# Patient Record
Sex: MALE | URBAN - METROPOLITAN AREA
[De-identification: names, ages, dates, MRNs, and addresses within clinical notes are randomized per-mention and may not be internally consistent; named-entity substitution may affect disease eponyms.]

---

## 2017-06-05 ENCOUNTER — HISTORICAL (OUTPATIENT)
Dept: ADMINISTRATIVE | Facility: HOSPITAL | Age: 57
End: 2017-06-05

## 2017-06-05 LAB
ABS NEUT (OLG): 14.4 X10(3)/MCL (ref 1.5–6.9)
ALBUMIN SERPL-MCNC: 2.7 GM/DL (ref 3.4–5)
ALBUMIN/GLOB SERPL: 0.84 MG/DL (ref 1.1–1.6)
ALP SERPL-CCNC: 82 UNIT/L (ref 45–117)
ALT SERPL-CCNC: 127 UNIT/L (ref 12–78)
AST SERPL-CCNC: 40 UNIT/L (ref 15–37)
BASOPHILS # BLD AUTO: 0 X10(3)/MCL (ref 0–0.2)
BASOPHILS NFR BLD AUTO: 0.1 % (ref 0–2)
BILIRUB SERPL-MCNC: 0.8 MG/DL (ref 0.2–1)
BILIRUBIN DIRECT+TOT PNL SERPL-MCNC: 0.2 MG/DL (ref 0–0.2)
BILIRUBIN DIRECT+TOT PNL SERPL-MCNC: 0.6 MG/DL (ref 0.2–0.8)
BUN SERPL-MCNC: 10 MG/DL (ref 7–18)
CALCIUM SERPL-MCNC: 7.7 MG/DL (ref 8.5–10.1)
CHLORIDE SERPL-SCNC: 101 MMOL/L (ref 98–107)
CO2 SERPL-SCNC: 29 MMOL/L (ref 21–32)
CREAT SERPL-MCNC: 0.79 MG/DL (ref 0.6–1.3)
EOSINOPHIL # BLD AUTO: 0.2 X10(3)/MCL (ref 0–0.7)
EOSINOPHIL NFR BLD AUTO: 1.1 % (ref 0–5)
ERYTHROCYTE [DISTWIDTH] IN BLOOD BY AUTOMATED COUNT: 14.6 % (ref 10.2–13.4)
GLOBULIN SER-MCNC: 3.2 GM/DL (ref 2–3.8)
GLUCOSE SERPL-MCNC: 106 MG/DL (ref 74–106)
HCT VFR BLD AUTO: 40.4 % (ref 39–50)
HGB BLD-MCNC: 14.2 GM/DL (ref 13.2–17.2)
IMM GRANULOCYTES # BLD AUTO: 0 10*3/UL (ref 0–0)
IMM GRANULOCYTES NFR BLD AUTO: 0.9 % (ref 0–2)
LYMPHOCYTES # BLD AUTO: 1.2 X10(3)/MCL (ref 0.5–4.1)
LYMPHOCYTES NFR BLD AUTO: 6.8 % (ref 21–51)
MCH RBC QN AUTO: 30.4 PG (ref 28–32)
MCHC RBC AUTO-ENTMCNC: 35.1 GM/DL (ref 33–36)
MCV RBC AUTO: 86.5 FL (ref 78–100)
MONOCYTES # BLD AUTO: 1.1 X10(3)/MCL (ref 0.1–1.1)
MONOCYTES NFR BLD AUTO: 6.5 % (ref 4–12)
NEUTROPHILS # BLD AUTO: 14.4 X10(3)/MCL (ref 1.5–6.9)
NEUTROPHILS NFR BLD AUTO: 84.6 % (ref 42–75)
PLATELET # BLD AUTO: 150 X10(3)/MCL (ref 130–400)
PMV BLD AUTO: 11.7 FL (ref 7.4–10.4)
POTASSIUM SERPL-SCNC: 3.69 MMOL/L (ref 3.5–5.1)
PROT SERPL-MCNC: 5.9 GM/DL (ref 6.4–8.2)
RBC # BLD AUTO: 4.67 X10(6)/MCL (ref 4.1–5.9)
SODIUM SERPL-SCNC: 134 MMOL/L (ref 136–145)
WBC # SPEC AUTO: 17 X10(3)/MCL (ref 4–10)

## 2022-04-30 NOTE — PROGRESS NOTES
"   Patient:   Shyam Whyte             MRN: [id number]            FIN: [id number]               Age:   57 years     Sex:  Male     :  1960   Associated Diagnoses:   Chronic respiratory conditions due to chemicals, gases, fumes and vapors; Continuous tobacco abuse; Moderate anxiety; Other specified types of non-Hodgkin lymphoma, intrapelvic lymph nodes   Author:   Gaudencio MEJIA, Lucia CALHOUN      Visit Information       Primary: Bernardino Benítez NP  Surgeon: Dr. Guevara Hightower  Cardiology: Dr. Tapia  Pulmonology: Dr. Ortiz      Visit type:  [facility] Visit.      No show 5/10/2017  NO Show 2017      Interval History       56 year old male has a new diagnosis of marginal zone lymphoma of the rectum.  Dr. Diallo did a colonoscopy   and  he was sent for a another colonoscopy with Dr. Castillo.  He had an area of suspicion in his  rectum.  A biopsy was taken 2016.  The pathology was reported 2016.  However, he was not told about this area  until 2017.    He has night sweats to the point of wet sheets-- started 1 year ago.  Sweats are almost every night.  NO fever.  NO wt loss.    He has had lung congestion for years and is on the "verge of COPD".  He has PFTs that indicate emphysema / COPD.  He continues to smoke.    Shyam is now  using his breathing treatments  and his lungs sound somewhat better.      His biopsy is scheduled for 3/17/2017 with Dr. Hightower could not be done because he was bleeding and on anticoagulants.   Therefore, given our limitations and his increasing rectal symptoms, I  started therapy with R-CVP .    He has called a few times with constipation.  vincristine canc cause constipation but his constipation lasted long after chemo out of his system.  His disease is a rectal lesion but I cannot help but think it is starting to respond to chemo--as lymphoma does.  It may be the nerves in his rectum are not functioning well.  He has pain, leakage and since starting  " "chemo-- he had one large clot and 4/15/2017 he had a much smaller clot.    Wife says he has confusion but this is usually med related.  He is on xanax and pain meds.  He is not to drive.    Stool caliber is getting "bigger".  Prior to chemo, his stools were thin.    5/23/2017- 2 weeks ago he missed his appt and chemo due to fever up to 103F. He did not get seendespite having 2 appts. rescheduled with Dr. Chase. He had N/V/D for 2 days with fever. Has had pain in left lower quadrant since. BM's have been normal. 6# wt. loss. Has not had a good appitite. Has been drinking Ensure and tries to make himself eat. Abdominal pain  7/10 with palpation, 5/10 otherwise for past 2 weeks.       GOAL OF THERAPY: 6  planned--radiation an option  Started R-CVP 3/28/17    Chemo: R-CVP--C1 -- 3/28/2017               R-CVP--C2--4/19/2017      Allergy: NKDA        PAST MEDICAL HISTORY:  1. appendectomy, tonsillectomy, neck surgery--fusion, rectal surgery 2001 prolapsed  Dr. Castillo  2. Colonoscopy 2014-- Dr. Diallo "everything was fine           but one polyp.  3. CT chest 2/9/2017- emphysema both apices                  few enlarged mediastinal lymph nodes             Right posterior 11th rib (unchanged since 3/2/2016)  4. CT abd/pelvis 2/9/2017-rectal mass 5.8 x 4.8 x 7.8 cm          left iliac adenopathy 3  noted.  5. MRI l spine--2/7/2017-- multi level arthritis. L4-5 disc protrusion  6. He had stents placed by Dr. Ocampo's wife-- in legs  7. PFT 3/14/2017-- FVC 51% predictive                     FEV1 48% , DLCO89%                   severe airway obstruction  8. Hep B negative.  9.  CAD-- 2 stent in LAD, 1 stents Right coronary--  10. COPD/emphysema  11.  Central Sleep apnea  12. Rectal marginal lymphoma--          with left iliac adenopathy         stage II disease-- rectal and             one other node group           Bone marrow biopsy  2/15/2017-- hypercellular for age                No lymphoma, normal flow              " "  normal cytogenetics          SOCIAL HISTORY:  .  5 children.  5 grandchildren--   Worked in [employer] field until he had an accident       [place].   but went on lower back           pain since -- disability since    He likes to fish but he his back and shoulder arthritis does not let       him.  "rotary cuff" is grinding."  Drinks 3 beers a day -- for years  [phone]  Jenn [phone]    FAMILY HISTORY:  Mother--unhealthy  Father  of brain aneurysm at 36  PGF  of throat cancerMaternal relatives with lung and colon cancer    CODE STATUS:  not addressed      Review of Systems   Constitutional:  Negative except as documented in history of present illness.    Eye:  Negative except as documented in history of present illness.    Ear/Nose/Mouth/Throat:  Negative except as documented in history of present illness.    Respiratory:  Negative except as documented in history of present illness.    Cardiovascular:  Negative except as documented in history of present illness.    Breast   Gastrointestinal:  Negative except as documented in history of present illness.    Genitourinary:  Negative except as documented in history of present illness.    Hematology/Lymphatics:  Negative except as documented in history of present illness.    Endocrine:  Negative except as documented in history of present illness.    Immunologic:  Negative except as documented in history of present illness.    Musculoskeletal:  Negative except as documented in history of present illness.    Integumentary:  Negative except as documented in history of present illness.    Neurologic:  Negative except as documented in history of present illness.    Psychiatric:  Negative except as documented in history of present illness.    All other systems.     Health Status   Allergies:    Allergic Reactions (Selected)  No Known Allergies,    Allergies (1) Active Reaction  No Known Allergies None Documented     Current medications: "  (Selected)   Outpatient Medications  Ordered  Vincasar PFS: 1.5 mg, form: Soln, IV Push, Once-Unscheduled, first dose 05/10/17 5:52:00 CDT, [id number]  cyclophosphamide: 1 gm, form: Powder-Inj, IV, Once-Unscheduled, Infuse over: 2 hr, first dose 05/10/17 5:49:00 CDT, [id number]  riTUXimab: 635 mg, form: Injection, IV, Once-Unscheduled, Infuse over: 2 hr, first dose 05/10/17 5:47:00 CDT, [id number]  Documented Medications  Documented  ALPRAZOLAM   TAB 0.5M.5 mg = 1 tab(s), Oral, TID  ALPRAZOLAM   TAB 1M mg = 1 tab(s), Oral, TID  AMPHET/DEXTR CAP 15MG ER: 15 mg = 1 cap(s), Oral, qAM  ATORVASTATIN TAB 20M mg = 1 tab(s), Oral, Daily  BUSPIRONE    TAB 10MG:   CARVEDILOL   TAB 25M mg = 1 tab(s), Oral, BID  CITALOPRAM   TAB 40M mg = 1 tab(s), Oral, Daily  CLOPIDOGREL  TAB 75MG:   DEXAMETHASON TAB 4MG:   DEXILANT     CAP 60MG DR: 60 mg = 1 cap(s), Oral, Daily  FLUCONAZOLE  TAB 200M mg = 1 tab(s), Oral, Daily  GABAPENTIN   CAP 100MG:   HYDROCO/APAP TAB 10-325M tab(s), Oral, BID  HYDROCO/APAP TAB 5-325MG:   HYDROXYZ MED CAP 50MG:   LISINOPRIL   TAB 5M mg = 1 tab(s), Oral, Daily  METOCLOPRAM  TAB 10MG:   METOCLOPRAM  TAB 5MG:   NYSTATIN     PHONG 120945:   ONDANSETRON  TAB 8MG:   PREDNISONE   TAB 50MG:   QUETIAPINE   TAB 200M mg = 1 tab(s), Oral, qPM  TRAMADOL HCL TAB 50M mg = 1 tab(s), Oral, BID   Problem list:    All Problems  Ulnar neuropathy of left upper extremity / SNOMED CT 589849823 / Confirmed  COPD - Chronic obstructive pulmonary disease / SNOMED CT 820142075 / Confirmed  Emphysema  Central sleep apnea / SNOMED CT 5350099563 / Confirmed  Marginal zone lymphoma / SNOMED CT 1750337349 / Confirmed  Rectal  Tobacco user / SNOMED CT 537121010 / Probable,    Active Problems (5)  Central sleep apnea   COPD - Chronic obstructive pulmonary disease   Marginal zone lymphoma   Tobacco user   Ulnar neuropathy of left upper extremity         Histories   Past Medical History:    Resolved  Antibody  "to hepatitis C (659851752):  Resolved.  Central sleep apnea (1250109474):  Resolved.  Emphysema/COPD ([id number]):  Resolved.   Family History:    Abdominal aneurysm, ruptured  Father (): onset at 36 .     Procedure history:    Bone marrow biopsy ([id number]) on 2/15/2017 at 56 Years.  Rectal biopsy (349603349) on 2016 at 56 Years.  Colonoscopy (104819443) in  at 54 Years.  Placement of stent ([id number]).  Comments:  5/10/2017 08:54 - Vivien Zimmerman LPN  " in legs"  Appendectomy (929308817).  Tonsillectomy (550918211).  Fusion (53615091).  Comments:  2017 08:21 - Vivien Zimmerman LPN  Neck   Social History        Social & Psychosocial Habits    Alcohol  2017  Use: Current    Type: Beer    Frequency: 3-5 times per week    Home/Environment  2017  Lives with: Spouse    Tobacco  2017  Use: Current every day smoker    Type: Cigarettes  .        Physical Examination   Vital Signs   2017 9:56 CDT       Temperature Oral          96.7 degF  LOW                             Peripheral Pulse Rate     65 bpm                             Respiratory Rate          18 br/min                             SpO2                      99 %                             Systolic Blood Pressure   150 mmHg  HI                             Diastolic Blood Pressure  91 mmHg  HI     Measurements from flowsheet : Measurements   2017 9:56 CDT       Weight Dosing             72.12 kg                             Weight Measured           72.12 kg                             Weight Measured and Calculated in Lbs     159.00 lb                             Height/Length Dosing      160 cm                             Height/Length Measured    160 cm                             BSA Measured              1.79 m2                             Body Mass Index Measured  28.17 kg/m2     General:  Alert and oriented, No acute distress.    Eye:  Extraocular movements are intact, " Vision unchanged.    HENT:  Normocephalic, Normal hearing.    Neck:  Supple, Non-tender, No lymphadenopathy.    Respiratory:  Respirations are non-labored, Symmetrical chest wall expansion, Mild congestion in right base, smokes.    Cardiovascular:  Normal rate, Regular rhythm.    Breast:  No mass, No tenderness.    Gastrointestinal:  Soft, Non-distended, Normal bowel sounds, Mild tenderness in left lower quadrant, no rebound.    Genitourinary:  No costovertebral angle tenderness, No urethral discharge.    Lymphatics:  No lymphadenopathy neck, axilla, groin.    Musculoskeletal:  Normal range of motion, Normal strength, No swelling, Normal gait.    Integumentary:  Warm, Dry, Intact.    Neurologic:  Alert, Oriented, Normal sensory, Normal motor function, No focal deficits.    Cognition and Speech:  Oriented, Speech clear and coherent, Functional cognition intact.    Psychiatric:  Cooperative, Appropriate mood & affect, Normal judgment.    ECOG Performance Scale: 0 - Fully active; no performance restrictions.      Review / Management   Radiology results   CT, With contrast, Reported at  5/4/2017 10:29:00, Reviewed radiologist's report      Impression and Plan   Impression:    1. Generalized anxiety disorder with medications since he was 17--                     he will need psychiatrist.  2. Rectal --marginal lymphoma with iliac nodes        Unable to get more  3. COPD/ Emphysema with terrible congestion and  wheezing.  Poor control      of COPD continued tobacco use-- I encouraged smoking cessation.        I explained he will likely die from his COPD than his lymphoma.    4.   Antiemetics discussed           diet, fluids, relief of constipation all stressed.             tobacco cessation.    5.  Hepatitis C antibody   .    Diagnosis     Chronic respiratory conditions due to chemicals, gases, fumes and vapors (WUM18-IV J68.4).     Continuous tobacco abuse (TOJ41-LR Z72.0).     Moderate anxiety (SGU54-UT F41.9).      Other specified types of non-Hodgkin lymphoma, intrapelvic lymph nodes (VQI01-BR C85.86).     Orders     1. Get Abdominal u/s today and repeat CBC. If all clear we will schedule for Cycle #3  I did review chemo teaching with the pt. and his wife. He is to come in or go to ER for any fever 100.4 or higher. Any illnesses need to be evaluated and treated ASAP. Cycle 3 has now been delayed 2 weeks and he continues to have elevated WBC and abdominal pain.   Once we establish whether he has an infectious process going on or not we can proceed with scheduling chemo at that time.       .     .      MARTHA